# Patient Record
Sex: FEMALE | Race: WHITE
[De-identification: names, ages, dates, MRNs, and addresses within clinical notes are randomized per-mention and may not be internally consistent; named-entity substitution may affect disease eponyms.]

---

## 2018-05-30 ENCOUNTER — HOSPITAL ENCOUNTER (OUTPATIENT)
Dept: HOSPITAL 92 - TBSIIMAG | Age: 68
Discharge: HOME | End: 2018-05-30
Attending: NEUROLOGICAL SURGERY
Payer: MEDICARE

## 2018-05-30 DIAGNOSIS — R60.0: ICD-10-CM

## 2018-05-30 DIAGNOSIS — M47.892: ICD-10-CM

## 2018-05-30 DIAGNOSIS — S32.039A: Primary | ICD-10-CM

## 2018-05-30 DIAGNOSIS — M48.02: ICD-10-CM

## 2018-05-30 DIAGNOSIS — S12.600A: ICD-10-CM

## 2018-05-30 DIAGNOSIS — M53.2X2: ICD-10-CM

## 2018-05-30 PROCEDURE — 72100 X-RAY EXAM L-S SPINE 2/3 VWS: CPT

## 2018-05-30 PROCEDURE — 72040 X-RAY EXAM NECK SPINE 2-3 VW: CPT

## 2018-05-30 PROCEDURE — 72141 MRI NECK SPINE W/O DYE: CPT
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
## 2018-05-30 NOTE — RAD
THREE VIEWS CERVICAL SPINE:

 

HISTORY: 

Spinal stenosis.  Neck pain.  Muscle spasm.

 

COMPARISON: 

None.

 

FINDINGS: 

A lateral view of the cervical spine is performed in the neutral position, along with extension and f
lexion.  The predental space is normal.  There is no prevertebral soft tissue swelling.  Straightenin
g of normal cervical lordosis.  There is moderate degenerative change at C3-C4, C4-C5, C5-C6, and C6-
C7.  Loss of disk space height and osteophyte formation is noted.  There is limited of the evaluation
 of the cardiothoracic junction.  In the neutral position, no significant spondylolisthesis.  No sign
ificant spondylolisthesis upon extension or flexion.

 

IMPRESSION: 

Extensive degenerative change of the cervical spine with loss of disk space height and osteophyte for
mation.  The patient is scheduled for an MRI today.  Please refer to that examination for better inte
rrogation of the contents of the central spinal canal and neural foramina.

 

POS: [facility]

## 2018-05-30 NOTE — RAD
LUMBAR SPINE TWO VIEWS:

 

History: Compression fracture. 

 

Comparison: None. 

 

FINDINGS: 

Mild loss of vertebral body height at L3 with sclerosis involving the superior aspect of L3. No signi
ficant retropulsion. Remaining lumbar spine vertebral body height is maintained. 

 

IMPRESSION: 

Mild compression fracture at L3. 

 

POS: [facility]

## 2018-08-09 ENCOUNTER — HOSPITAL ENCOUNTER (OUTPATIENT)
Dept: HOSPITAL 92 - TBSIIMAG | Age: 68
Discharge: HOME | End: 2018-08-09
Attending: NEUROLOGICAL SURGERY
Payer: MEDICARE

## 2018-08-09 DIAGNOSIS — M54.2: ICD-10-CM

## 2018-08-09 DIAGNOSIS — M47.896: ICD-10-CM

## 2018-08-09 DIAGNOSIS — I70.90: ICD-10-CM

## 2018-08-09 DIAGNOSIS — M47.892: ICD-10-CM

## 2018-08-09 DIAGNOSIS — M54.5: Primary | ICD-10-CM

## 2018-08-09 PROCEDURE — 72040 X-RAY EXAM NECK SPINE 2-3 VW: CPT

## 2018-08-09 PROCEDURE — 72100 X-RAY EXAM L-S SPINE 2/3 VWS: CPT

## 2018-08-09 NOTE — RAD
CERVICAL SPINE FOUR VIEWS:

 

HISTORY:

Neck pain.

 

FINDINGS:

There is loss of the normal lordotic curvature.  Disk space narrowing at the C4-C5 and C5-C6 levels. 
 Minimal degenerative retrolisthesis at the C4-C5 level.  Prominent osteophytosis throughout the vert
ebral bodies and facets.  The cervicothoracic junction is intact.  No acute fracture, dislocation, or
 aggressive osseous erosions.

 

IMPRESSION:

Prominent cervical spondylosis.

 

[facility]: [facility]

## 2018-08-09 NOTE — RAD
LUMBAR SPINE TWO VIEWS:

 

HISTORY:

Fracture.  Followup.

 

COMPARISON:

05/30/2018

 

FINDINGS:

Compression fracture at the L3 superior endplate with very mild loss of height is similar in appearan
ce to the previous study.  Minimal degenerative spondylolisthesis at the L3-L4 levels is stable.  Ost
eophytosis and other degenerative changes.  Calcification over the arterial structures.

 

IMPRESSION:

1.  L3 superior endplate compression and degenerative changes appear stable.

 

2.  Atherosclerosis.

 

[facility]: [facility]